# Patient Record
Sex: FEMALE | Race: WHITE | Employment: FULL TIME | URBAN - METROPOLITAN AREA
[De-identification: names, ages, dates, MRNs, and addresses within clinical notes are randomized per-mention and may not be internally consistent; named-entity substitution may affect disease eponyms.]

---

## 2018-07-02 ENCOUNTER — LAB ENCOUNTER (OUTPATIENT)
Dept: LAB | Age: 28
End: 2018-07-02
Attending: OBSTETRICS & GYNECOLOGY
Payer: COMMERCIAL

## 2018-07-02 DIAGNOSIS — Z01.419 PAP SMEAR, LOW-RISK: ICD-10-CM

## 2018-07-02 PROCEDURE — 87591 N.GONORRHOEAE DNA AMP PROB: CPT

## 2018-07-02 PROCEDURE — 88175 CYTOPATH C/V AUTO FLUID REDO: CPT

## 2018-07-02 PROCEDURE — 87491 CHLMYD TRACH DNA AMP PROBE: CPT

## 2018-07-03 LAB
C TRACH DNA SPEC QL NAA+PROBE: NEGATIVE
N GONORRHOEA DNA SPEC QL NAA+PROBE: NEGATIVE

## 2018-07-05 LAB — LAST PAP RESULT: NORMAL

## 2021-03-24 PROBLEM — Z87.42 HISTORY OF PCOS: Status: ACTIVE | Noted: 2021-03-24

## 2022-10-07 PROBLEM — N92.0 MENORRHAGIA WITH REGULAR CYCLE: Status: ACTIVE | Noted: 2022-10-07

## 2022-10-07 PROBLEM — N94.6 DYSMENORRHEA: Status: ACTIVE | Noted: 2022-10-07

## 2023-07-20 ENCOUNTER — OFFICE VISIT (OUTPATIENT)
Dept: FAMILY MEDICINE CLINIC | Facility: CLINIC | Age: 33
End: 2023-07-20
Payer: COMMERCIAL

## 2023-07-20 VITALS
HEART RATE: 88 BPM | WEIGHT: 185 LBS | RESPIRATION RATE: 18 BRPM | HEIGHT: 68 IN | BODY MASS INDEX: 28.04 KG/M2 | DIASTOLIC BLOOD PRESSURE: 66 MMHG | OXYGEN SATURATION: 98 % | TEMPERATURE: 98 F | SYSTOLIC BLOOD PRESSURE: 108 MMHG

## 2023-07-20 DIAGNOSIS — Z00.00 WELLNESS EXAMINATION: Primary | ICD-10-CM

## 2023-07-20 DIAGNOSIS — E28.2 POLYCYSTIC OVARY SYNDROME: ICD-10-CM

## 2023-07-20 DIAGNOSIS — Z11.59 NEED FOR HEPATITIS C SCREENING TEST: ICD-10-CM

## 2023-07-20 DIAGNOSIS — Z23 NEED FOR TDAP VACCINATION: ICD-10-CM

## 2023-07-20 DIAGNOSIS — Z00.00 LABORATORY EXAM ORDERED AS PART OF ROUTINE GENERAL MEDICAL EXAMINATION: ICD-10-CM

## 2023-07-20 PROBLEM — E05.90 SUBCLINICAL HYPERTHYROIDISM: Status: ACTIVE | Noted: 2022-01-11

## 2023-07-20 PROCEDURE — 99385 PREV VISIT NEW AGE 18-39: CPT | Performed by: FAMILY MEDICINE

## 2023-07-20 PROCEDURE — 3074F SYST BP LT 130 MM HG: CPT | Performed by: FAMILY MEDICINE

## 2023-07-20 PROCEDURE — 3008F BODY MASS INDEX DOCD: CPT | Performed by: FAMILY MEDICINE

## 2023-07-20 PROCEDURE — 90715 TDAP VACCINE 7 YRS/> IM: CPT | Performed by: FAMILY MEDICINE

## 2023-07-20 PROCEDURE — 99203 OFFICE O/P NEW LOW 30 MIN: CPT | Performed by: FAMILY MEDICINE

## 2023-07-20 PROCEDURE — 90471 IMMUNIZATION ADMIN: CPT | Performed by: FAMILY MEDICINE

## 2023-07-20 PROCEDURE — 3078F DIAST BP <80 MM HG: CPT | Performed by: FAMILY MEDICINE

## 2023-07-20 NOTE — PATIENT INSTRUCTIONS
Go for your fasting blood tests. Do not eat or drink except for water for at least 8 hours prior to the blood tests. Do not take any Vitamins or Biotin for 3 days before your blood tests. Schedule your appointment with Dr. Afua Lopez for follow up on your PCOS. You received the Tdap (tetanus, diphtheria, pertussis-whooping cough)  vaccine today. You may run a low grade fever, have mild redness or swelling at the site of the shot, muscle pain at the site of the shot for the next 2-3 days. You may take Tylenol or Ibuprofen as needed. Use your arm to help decrease pain and swelling. You can apply ice to any swelling for 10-15 minutes twice daily through clothing or a towel. Recommendations for exercise are 3-5 times weekly for 30-60 minutes for a minimum of 150-300 minutes. For premenopausal women and men, 1000 mg of calcium daily is recommended. For postmenopausal women, 1200 mg of calcium daily is recommended. To help the body absorb and use calcium, vitamin D 2000 international units daily is recommended. I will contact you with your test results once available.

## 2024-09-05 ENCOUNTER — OFFICE VISIT (OUTPATIENT)
Facility: CLINIC | Age: 34
End: 2024-09-05
Payer: COMMERCIAL

## 2024-09-05 VITALS
DIASTOLIC BLOOD PRESSURE: 82 MMHG | HEIGHT: 68 IN | SYSTOLIC BLOOD PRESSURE: 124 MMHG | BODY MASS INDEX: 30.31 KG/M2 | WEIGHT: 200 LBS

## 2024-09-05 DIAGNOSIS — N94.6 DYSMENORRHEA: ICD-10-CM

## 2024-09-05 DIAGNOSIS — Z12.4 CERVICAL CANCER SCREENING: Primary | ICD-10-CM

## 2024-09-05 DIAGNOSIS — N92.0 MENORRHAGIA WITH REGULAR CYCLE: ICD-10-CM

## 2024-09-05 DIAGNOSIS — Z87.42 HISTORY OF PCOS: ICD-10-CM

## 2024-09-05 DIAGNOSIS — Z01.419 WELL WOMAN EXAM WITH ROUTINE GYNECOLOGICAL EXAM: ICD-10-CM

## 2024-09-05 PROCEDURE — 99395 PREV VISIT EST AGE 18-39: CPT

## 2024-09-05 PROCEDURE — 88175 CYTOPATH C/V AUTO FLUID REDO: CPT

## 2024-09-05 PROCEDURE — 3079F DIAST BP 80-89 MM HG: CPT

## 2024-09-05 PROCEDURE — 3074F SYST BP LT 130 MM HG: CPT

## 2024-09-05 PROCEDURE — 87624 HPV HI-RISK TYP POOLED RSLT: CPT

## 2024-09-05 PROCEDURE — 3008F BODY MASS INDEX DOCD: CPT

## 2024-09-05 NOTE — PROGRESS NOTES
GYN H&P     Genetic questionnaire reviewed with the patient and she will be referred for genetic counseling if the questionnaire had any positive results.    The [facility] intake form was also reviewed regarding contraception, menstrual periods, urinary health, and vaginal / sexual health    2024  9:19 AM    Chief Complaint   Patient presents with    Physical     Co heavy periods due to PCOS. Reports periods are painful and lots of clotting.       HPI: Sue is a 34 year old  Patient's last menstrual period was 2024 (exact date).  (contraception: none) here for her annual gyn exam.     Last seen in office 2 years ago. Hx of irregular menses with diagnosed PCOS based on ultrasound and lab work. Lab work was ordered by Dr. Byrd 10/2022 to evaluate PCOS but they were never completed and the orders have since . Endorses heavy, painful periods - noticed they have gotten heavier in the past year with quarter-sized clots. Has to change pads Q30-45 mintues. Still getting monthly periods. Has been trying to conceive for the past 2 years and not interested in any contraception at this time. Denies any pelvic or breast complaints.      Was previously taking spironolactone and metformin prior to having her first child - states that once she stopped that, she was able to naturally conceive.     Previous encounters and chart reviewed.     OB History    Para Term  AB Living   3 1 1 0 2 1   SAB IAB Ectopic Multiple Live Births   2 0 0 0 1      # Outcome Date GA Lbr Storm/2nd Weight Sex Type Anes PTL Lv   3 Term 16 41w5d  7 lb 9.2 oz (3.435 kg) F CS-LTranv EPI N TRICIA      Complications: Failure to progress   2 2015 5w0d          1 SAB 10/2014 6w0d              GYN hx:   Menarche: 13  Period Cycle (Days): 28-30  Period Duration (Days): 7-9  Period Flow: heavy days 1-3  Use of Birth Control (if yes, specify type): None  Pap Date: 18  Pap Result Notes: neg  Follow  Up Recommendation: due      Past Medical History:    Anemia    Genitourinary disease    condyloma on vulva    PCOS (polycystic ovarian syndrome)     Past Surgical History:   Procedure Laterality Date          Other surgical history N/A 2016    Procedure: ;  Surgeon: Astrid Sue MD;  Location:  [facility]    Tonsillectomy       No Known Allergies  Current Outpatient Medications on File Prior to Visit   Medication Sig Dispense Refill    Multiple Vitamins-Minerals (MULTIPLE VITAMINS/WOMENS OR) Take by mouth.      fluticasone propionate 50 MCG/ACT Nasal Suspension SHAKE LIQUID AND USE 1 SPRAY IN EACH NOSTRIL EVERY DAY FOR 10 DAYS AS NEEDED       No current facility-administered medications on file prior to visit.     Family History   Problem Relation Age of Onset    Diabetes Mother     Hypertension Mother     Depression Mother     Depression Father     Hypertension Father     Alcohol and Other Disorders Associated Father     Bipolar Disorder Paternal Grandmother     Pancreatic Cancer Maternal Grandmother      Social History     Socioeconomic History    Marital status:      Spouse name: Not on file    Number of children: Not on file    Years of education: Not on file    Highest education level: Not on file   Occupational History    Occupation: Netology technician     Employer: [employer]   Tobacco Use    Smoking status: Former     Current packs/day: 0.50     Types: Cigarettes    Smokeless tobacco: Never   Substance and Sexual Activity    Alcohol use: No    Drug use: No    Sexual activity: Yes     Partners: Male   Other Topics Concern     Service No    Blood Transfusions No    Caffeine Concern Yes     Comment: 1-3 daily    Occupational Exposure Yes     Comment: diesel and dust fumes, outside airborne pollutants and fumes    Hobby Hazards No    Sleep Concern No     Comment: 6-7 hours, mostly rested    Stress Concern Yes     Comment: family issues    Weight Concern No    Special  Diet No    Back Care No    Exercise Yes     Comment: not routine, has walking daily outside for 8 hour shift    Bike Helmet No    Seat Belt Yes    Self-Exams No   Social History Narrative    Not on file     Social Determinants of Health     Financial Resource Strain: Not on file   Food Insecurity: Not on file   Transportation Needs: Not on file   Physical Activity: Not on file   Stress: Not on file   Social Connections: Not on file   Housing Stability: Not on file       ROS:     Review of Systems:  General: denies fevers, chills, fatigue and malaise.   Eyes: no visual changes, denies headaches  ENT: no complaints, denies earaches, runny nose, epistaxis, throat pain or sore throat  Respiratory: denies SOB, dyspnea, cough or wheezing  Cardiovascular: denies chest pain, palpitations, exercise intolerance   GI: denies abdominal pain, diarrhea, constipation  : no complaints, denies dysuria, increased urinary frequency. Menses regular, endorses dysmenorrhea and menorrhagia, no dyspareunia   Hematological/lymphatic: denies history of excessive bleeding or bruising, denies dizziness, lightheadedness.   Breast: denies rashes, skin changes, pain, lumps or discharge   Psychiatric: denies depression, changes in sleep patterns, anxiety  Endocrine: denies hot or cold intolerance, mood changes   Neurological: denies changes in sight, smell, hearing or taste. Denies seizures or tremors  Immunological: denies allergies, denies anaphylaxis, or swollen lymph nodes  Musculoskeletal: denies joint pain, morning stiffness, decreased range of motion         O /82   Ht 68\"   Wt 200 lb (90.7 kg)   LMP 08/23/2024 (Exact Date)   BMI 30.41 kg/m²         Wt Readings from Last 6 Encounters:   09/05/24 200 lb (90.7 kg)   07/20/23 185 lb (83.9 kg)   10/07/22 191 lb (86.6 kg)   07/24/16 217 lb (98.4 kg)   11/19/13 166 lb 12.8 oz (75.7 kg)   10/11/10 167 lb (75.8 kg)     Exam:   GENERAL: well developed, well nourished, in no apparent  distress, oriented.  SKIN: no rashes, no suspicious lesions  HEENT: normal  NECK: supple; no thyromegaly, no adenopathy  LUNGS: clear to auscultation  CARDIOVASCULAR: normal S1, S2, RRR  BREASTS: soft, nontender, no palpable masses or nodes, no nipple discharge, no skin changes, no axillary adenopathy  ABDOMEN: c/s scars,  soft, non distended; non tender, no masses  PELVIC: External Genitalia: Normal appearing, no lesions.    Vagina: normal pink mucosa, no lesions, normal clear discharge.    Bladder well supported.  No  anterior or posterior hernias    Cervix: nulliparous, no lesions , No CMT     Uterus: AVAF, mobile, non tender, normal size    Adnexa: non tender, no masses, normal size    Rectal: deferred  EXTREMITIES:  non tender without edema      A/P: Patient is 34 year old female with no complaints. Here for well woman exam.          Patient counseled on:    Diet/exercise.      Self Breast Exams     Safe sex practices / and living environment     Vaccines:  Annual Flu, Tdap +/- Gardasil  recommended (up to 45 yrs).      Pneumococcal at 65 yrs old, Shingles at 60 yrs old          Pap: neg/neg - Year:  7/2018  GC/Chlamydia:  n/a      Meds This Visit:    Requested Prescriptions      No prescriptions requested or ordered in this encounter       1. Cervical cancer screening  - ThinPrep PAP with HPV Reflex Request B; Future  - ThinPrep PAP with HPV Reflex Request B    2. Well woman exam with routine gynecological exam    3. History of PCOS  - LH (Luteinizing Hormone); Future  - FSH; Future  - TSH W Reflex To Free T4; Future  - Estradiol; Future  - Prolactin; Future  - Lipid Panel; Future  - Insulin; Future  - Glucose, Serum; Future  - Hemoglobin A1C; Future  - Dehydroepiandrosterone Sulfate; Future  - Testosterone,Total and Weakly Bound w/ SHBG; Future  - CBC W Differential W Platelet; Future    4. Dysmenorrhea    5. Menorrhagia with regular cycle    Plan pelvic ultrasound  Lab work ordered to assess hormone levels  and thyroid    Return in about 2 weeks (around 9/19/2024) for ultrasound.    Carli Glenn, APRN   9/5/2024  9:19 AM       This note was created by Dragon voice recognition. Errors in content may be related to improper recognition by the system; efforts to review and correct have been done but errors may still exist. Please contact me with any questions.     Continue Regimen: Levocetirizine 5mg: qd qhs Plan: Dr. Beasley discussed Dermatitis Herpetiformis and if bx confirms diagnosis then consider Dapsone 25mg but labs will need to be performed. Patient states she had labs performed a month ago and will bring copy to office. Dr. Beasley recommended pt to research information on gluten free diet. Detail Level: Zone

## 2024-09-10 LAB
.: ABNORMAL
.: ABNORMAL

## 2024-09-11 LAB — HPV E6+E7 MRNA CVX QL NAA+PROBE: NEGATIVE

## 2024-09-14 ENCOUNTER — LAB ENCOUNTER (OUTPATIENT)
Dept: LAB | Age: 34
End: 2024-09-14

## 2024-09-17 NOTE — TELEPHONE ENCOUNTER
Pt called to review ultrasound results and next steps in terms of conceiving. Discussed metformin - pt declines at this time but open to clomid for ovulation induction. Discussed timed intercourse and checking hormone levels throughout her cycle. [employer] message with patient instructions will be sent as well.